# Patient Record
Sex: MALE | Race: WHITE | Employment: FULL TIME | URBAN - METROPOLITAN AREA
[De-identification: names, ages, dates, MRNs, and addresses within clinical notes are randomized per-mention and may not be internally consistent; named-entity substitution may affect disease eponyms.]

---

## 2020-02-06 ENCOUNTER — OFFICE VISIT (OUTPATIENT)
Dept: ORTHOPEDIC SURGERY | Age: 60
End: 2020-02-06
Payer: COMMERCIAL

## 2020-02-06 VITALS
HEIGHT: 72 IN | SYSTOLIC BLOOD PRESSURE: 157 MMHG | HEART RATE: 83 BPM | BODY MASS INDEX: 30.48 KG/M2 | DIASTOLIC BLOOD PRESSURE: 87 MMHG | WEIGHT: 225 LBS

## 2020-02-06 PROCEDURE — 99203 OFFICE O/P NEW LOW 30 MIN: CPT | Performed by: ORTHOPAEDIC SURGERY

## 2020-02-06 PROCEDURE — 20610 DRAIN/INJ JOINT/BURSA W/O US: CPT | Performed by: ORTHOPAEDIC SURGERY

## 2020-02-06 RX ORDER — METHYLPREDNISOLONE ACETATE 40 MG/ML
80 INJECTION, SUSPENSION INTRA-ARTICULAR; INTRALESIONAL; INTRAMUSCULAR; SOFT TISSUE ONCE
Status: COMPLETED | OUTPATIENT
Start: 2020-02-06 | End: 2020-02-06

## 2020-02-06 RX ORDER — LIDOCAINE HYDROCHLORIDE 10 MG/ML
4 INJECTION, SOLUTION INFILTRATION; PERINEURAL ONCE
Status: COMPLETED | OUTPATIENT
Start: 2020-02-06 | End: 2020-02-06

## 2020-02-06 RX ADMIN — LIDOCAINE HYDROCHLORIDE 4 ML: 10 INJECTION, SOLUTION INFILTRATION; PERINEURAL at 15:17

## 2020-02-06 RX ADMIN — METHYLPREDNISOLONE ACETATE 80 MG: 40 INJECTION, SUSPENSION INTRA-ARTICULAR; INTRALESIONAL; INTRAMUSCULAR; SOFT TISSUE at 15:18

## 2020-02-06 ASSESSMENT — ENCOUNTER SYMPTOMS
CHEST TIGHTNESS: 1
GASTROINTESTINAL NEGATIVE: 1
EYES NEGATIVE: 1
ALLERGIC/IMMUNOLOGIC NEGATIVE: 1

## 2020-02-06 NOTE — PROGRESS NOTES
lesions. Trunk:  inspection reveals no rashes, ulcerations or lesions. Right Lower Extremity: inspection reveals no rashes, ulcerations or lesions. Left Lower Extremity: inspection reveals no rashes, ulcerations or lesions. Examination of the bilateral hips reveals normal flexion and extension. There is no restriction in rotation. There is no tenderness to palpation anteriorly posteriorly or laterally. Left knee examination demonstrates no effusion. There is no tenderness to palpation over the medial or lateral joint line. There is no discomfort over the patellar tendon. There is no palpable popliteal cyst.  Sensation is intact. Range of motion is normal.  There is no patellofemoral crepitation. There is no instability to varus or valgus stress applied at 0, 30, 60, or 90Â° of flexion. There is no anterior or posterior drawer. Lachman examination is normal.      Right knee examination shows a small effusion. He has no pain at terminal extension but complains of discomfort and tightness posteriorly with terminal flexion. Calf is soft. He has no significant medial or lateral joint line tenderness. Anterior and posterior drawer and Lachman tests are negative. Calf is soft without DVT. X-rays were obtained today. AP standing, PA flexed, and merchant views of the bilateral knees as well as a lateral of the right knee. These demonstrate: End-stage, bone-on-bone medial compartment arthritis with calcifications in the popliteal fossa but no acute bony abnormality noted. Assessment:      Exacerbation of right knee arthritis      Plan:      Initially, I  would recommend a cortisone injection to calm down his inflammatory symptoms followed by appropriate therapy. We discussed this and he agrees. He will follow-up with me in about a month. Procedure: Under sterile technique, the right knee was injected into the anterolateral arthroscopy portal with 80 mg of Depo-Medrol and 40 mg of lidocaine. He tolerated that well. This note was created using voice recognition software. It has been proofread, but occasionally errors remain. Please disregard these errors. They will be corrected as they are noted.

## 2020-02-11 ENCOUNTER — HOSPITAL ENCOUNTER (OUTPATIENT)
Dept: PHYSICAL THERAPY | Age: 60
Setting detail: THERAPIES SERIES
Discharge: HOME | End: 2020-02-11
Payer: COMMERCIAL

## 2020-06-16 ENCOUNTER — TELEPHONE (OUTPATIENT)
Dept: ORTHOPEDIC SURGERY | Age: 60
End: 2020-06-16

## 2025-05-11 ENCOUNTER — OFFICE VISIT (OUTPATIENT)
Age: 65
End: 2025-05-11

## 2025-05-11 VITALS
DIASTOLIC BLOOD PRESSURE: 92 MMHG | WEIGHT: 228 LBS | HEIGHT: 72 IN | BODY MASS INDEX: 30.88 KG/M2 | HEART RATE: 86 BPM | SYSTOLIC BLOOD PRESSURE: 175 MMHG | OXYGEN SATURATION: 97 % | TEMPERATURE: 98.6 F

## 2025-05-11 DIAGNOSIS — R03.0 ELEVATED BLOOD PRESSURE READING IN OFFICE WITHOUT DIAGNOSIS OF HYPERTENSION: ICD-10-CM

## 2025-05-11 DIAGNOSIS — S46.812A STRAIN OF LEFT TRAPEZIUS MUSCLE, INITIAL ENCOUNTER: Primary | ICD-10-CM

## 2025-05-11 PROBLEM — H35.412 LATTICE DEGENERATION OF LEFT RETINA: Status: ACTIVE | Noted: 2025-05-11

## 2025-05-11 PROBLEM — L82.1 SEBORRHEIC KERATOSIS: Status: ACTIVE | Noted: 2025-03-12

## 2025-05-11 PROBLEM — H43.819 POSTERIOR VITREOUS DETACHMENT: Status: ACTIVE | Noted: 2025-05-11

## 2025-05-11 PROBLEM — D18.01 HEMANGIOMA OF SKIN AND SUBCUTANEOUS TISSUE: Status: ACTIVE | Noted: 2025-03-12

## 2025-05-11 PROBLEM — L81.9 DISORDER OF PIGMENTATION: Status: ACTIVE | Noted: 2025-03-12

## 2025-05-11 PROBLEM — H26.493 PCO (POSTERIOR CAPSULAR OPACIFICATION), BILATERAL: Status: ACTIVE | Noted: 2025-05-11

## 2025-05-11 PROBLEM — L71.9 ROSACEA: Status: ACTIVE | Noted: 2025-03-12

## 2025-05-11 PROBLEM — H33.312 HORSESHOE RETINAL TEAR, LEFT EYE: Status: ACTIVE | Noted: 2025-05-11

## 2025-05-11 PROBLEM — D22.5 MELANOCYTIC NEVUS OF TRUNK: Status: ACTIVE | Noted: 2025-03-12

## 2025-05-11 PROBLEM — Z96.1 ARTIFICIAL LENS PRESENT: Status: ACTIVE | Noted: 2025-05-11

## 2025-05-11 PROBLEM — L91.9 HYPERTROPHIC CONDITION OF SKIN: Status: ACTIVE | Noted: 2025-03-12

## 2025-05-11 RX ORDER — METHOCARBAMOL 500 MG/1
500 TABLET, FILM COATED ORAL 2 TIMES DAILY
Qty: 6 TABLET | Refills: 0 | Status: SHIPPED | OUTPATIENT
Start: 2025-05-11 | End: 2025-05-14

## 2025-05-11 NOTE — PROGRESS NOTES
Kumar Lau (:  1960) is a 64 y.o. male,New patient, here for evaluation of the following chief complaint(s):  Pain (Pt states poss pulled muscle , started today behind his shoulder has gotten worse R side, causes pain to turn neck to the side x today )      ASSESSMENT/PLAN:  No diagnosis found.        Follow up with PCP in *** days if symptoms persist or if symptoms worsen.    SUBJECTIVE/OBJECTIVE:  HPI  HPI:   64 y.o. male presents with symptoms of *** ongoing since ***. Denies ***. Has taken *** for symptoms ***    Vitals:    25 [id number]   BP: (!) 183/78 (!) 175/92   BP Site: Left Upper Arm Left Upper Arm   Patient Position: Sitting Sitting   BP Cuff Size: Medium Adult Medium Adult   Pulse: 86    Temp: 98.6 °F (37 °C)    TempSrc: Oral    SpO2: 97%    Weight: 103.4 kg (228 lb)    Height: 1.829 m (6')        Review of Systems    Physical Exam      An electronic signature was used to authenticate this note.    --DAVID Thompson

## 2025-05-11 NOTE — PROGRESS NOTES
Kumar Lau (:  1960) is a 64 y.o. male,New patient, here for evaluation of the following chief complaint(s):  Pain (Pt states poss pulled muscle , started today behind his shoulder has gotten worse R side, causes pain to turn neck to the side x today )      ASSESSMENT/PLAN:    ICD-10-CM    1. Strain of left trapezius muscle, initial encounter  S46.812A methocarbamol (ROBAXIN) 500 MG tablet      2. Elevated blood pressure reading in office without diagnosis of hypertension  R03.0 [facility]          Patient presents for left sided shoulder pain ongoing since today.   On patient does have full range of motion of the neck.  He does have left-sided painm along the trapezius muscle that masturbated by palpation and by turning to the left  DDX: Muscle strain vs pinched nerve  I did have a long conversation with the patient and explained that although not high on my differential there is concern that this neck pain could be from cardiac etiology including MI. We cannot fully rule out MI here as I do not have same day blood work that I would be able to get. He states understanding and risks. Patient states that he would like to treat as musculoskeletal etiology and if his symptoms persist or worsen he will go to to ED.   RX: robaxin, explained to patient that he should use smallest amount needed for pain, and should switch to acetaminophen when he is able to. He is educated that his does cause drowsiness, he should not drive or operate heavy machinery after taking this.   Elevated BP here patient is not on anti-hypertensive, will give referral to PCP for BP.     SUBJECTIVE/OBJECTIVE:    History provided by:  Patient   used: No    Pain  Associated symptoms: no abdominal pain, no chest pain, no cough, no diarrhea, no fatigue, no fever, no nausea, no rash, no shortness of breath and no vomiting      HPI:   64 y.o. male presents with symptoms of left-sided

## 2025-05-11 NOTE — PATIENT INSTRUCTIONS
Please take medication as prescribed  Please note that this does cause drowsiness, do not operate machinery or drive after taking medication.